# Patient Record
Sex: MALE | Race: WHITE
[De-identification: names, ages, dates, MRNs, and addresses within clinical notes are randomized per-mention and may not be internally consistent; named-entity substitution may affect disease eponyms.]

---

## 2018-07-22 ENCOUNTER — HOSPITAL ENCOUNTER (EMERGENCY)
Dept: HOSPITAL 11 - JP.ED | Age: 43
Discharge: HOME | End: 2018-07-22
Payer: SELF-PAY

## 2018-07-22 VITALS — SYSTOLIC BLOOD PRESSURE: 136 MMHG | DIASTOLIC BLOOD PRESSURE: 90 MMHG

## 2018-07-22 DIAGNOSIS — F17.210: ICD-10-CM
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
## 2018-07-22 DIAGNOSIS — S62.620B: Primary | ICD-10-CM

## 2018-07-22 PROCEDURE — 73140 X-RAY EXAM OF FINGER(S): CPT

## 2018-07-22 PROCEDURE — 90471 IMMUNIZATION ADMIN: CPT

## 2018-07-22 PROCEDURE — 99284 EMERGENCY DEPT VISIT MOD MDM: CPT

## 2018-07-22 PROCEDURE — 96372 THER/PROPH/DIAG INJ SC/IM: CPT

## 2018-07-22 PROCEDURE — 90715 TDAP VACCINE 7 YRS/> IM: CPT

## 2018-07-22 NOTE — EDM.PDOC
ED HPI GENERAL MEDICAL PROBLEM





- General


Chief Complaint: Upper Extremity Injury/Pain


Stated Complaint: BROKEN RT POINTER FINGER


Time Seen by Provider: 07/22/18 09:32


Source of Information: Reports: Patient, RN Notes Reviewed


History Limitations: Reports: No Limitations





- History of Present Illness


INITIAL COMMENTS - FREE TEXT/NARRATIVE: 





42-year-old gentleman presents to the emergency department today with complaint 

of trauma to his right pointer finger, he accidentally crushed this between a 

metal stake in a 10 pound slight September 2 days ago, he would like to have it 

checked out he does have a laceration over the proximal phalange he and his 

started to heal by second intention he has had this wound wrapped


  ** Right 2-Index finger


Pain Score (Numeric/FACES): 8





- Related Data


 Allergies











Allergy/AdvReac Type Severity Reaction Status Date / Time


 


No Known Allergies Allergy   Verified 07/22/18 09:23











Home Meds: 


 Home Meds





NK [No Known Home Meds]  07/22/18 [History]











Past Medical History


Musculoskeletal History: Reports: Fracture





- Past Surgical History


GI Surgical History: Reports: Appendectomy, Hernia Repair/Other





Social & Family History





- Family History


Cardiac: Reports: MI


Other GI Family History: Colon cancer


Neurological: Reports: CVA


Oncologic: Reports: Colon





- Tobacco Use


Smoking Status *Q: Current Every Day Smoker


Years of Tobacco use: 16


Packs/Tins Daily: 0.5





- Caffeine Use


Caffeine Use: Reports: Coffee





- Alcohol Use


Days Per Week of Alcohol Use: 2


Number of Drinks Per Day: 6


Total Drinks Per Week: 12





- Recreational Drug Use


Recreational Drug Use: Yes


Drug Use in Last 12 Months: Yes


Recreational Drug Type: Reports: Marijuana/Hashish


Recreational Drug Use Frequency: Socially





Review of Systems





- Review of Systems


Review Of Systems: See Below


Musculoskeletal: Reports: Hand Pain


Skin: Reports: Wound


Neurological: Reports: No Symptoms





ED EXAM, GENERAL





- Physical Exam


Exam: See Below


Free Text/Narrative:: 





Examination of the right hand I do appreciate approximately 2 cm laceration 

appears to be superficial of the started to heal by second intention over the 

dorsal aspect of the phalange he proximal digit #2 he has edema appreciated in 

digit #2 there is limited range of motion but he can move all joints and all 

digits secondary to edema and pain





ED TRAUMA EXTREMITY PROCEDURES





- Splinting


  ** Right 2nd Digit


Splint Site: Second digit middle phalanx


Pre-Procedure NV Status: Normal


Post-Procedure NV Status: Normal


Splint Material: Aluminum-Foam


Splint Design: Other (Finger)


Applied & Form Fitted By: Nurse


Provider Post-Splint Application NV Check: NV Status Normal, Good Position


Complications: No





Course





- Vital Signs


Last Recorded V/S: 


 Last Vital Signs











Temp  98.4 F   07/22/18 09:28


 


Pulse  90   07/22/18 09:28


 


Resp  14   07/22/18 09:28


 


BP  136/90   07/22/18 09:28


 


Pulse Ox  100   07/22/18 09:28














- Orders/Labs/Meds


Orders: 


 Active Orders 24 hr











 Category Date Time Status


 


 Fingers Second Digit Rt F6 [CR] Stat Exams  07/22/18 09:34 Taken














Departure





- Departure


Time of Disposition: 10:16


Disposition: Home, Self-Care 01


Condition: Good


Clinical Impression: 


Open fracture of middle phalanx of right index finger


Qualifiers:


 Encounter type: initial encounter Fracture alignment: displaced Qualified Code(

s): S62.620B - Displaced fracture of middle phalanx of right index finger, 

initial encounter for open fracture








- Discharge Information


Referrals: 


PCP,None [Primary Care Provider] - 


Forms:  ED Department Discharge


Additional Instructions: 


Please follow-up with orthopedics tomorrow





- My Orders


Last 24 Hours: 


My Active Orders





07/22/18 09:34


Fingers Second Digit Rt F6 [CR] Stat 














- Assessment/Plan


Last 24 Hours: 


My Active Orders





07/22/18 09:34


Fingers Second Digit Rt F6 [CR] Stat 











Plan: 





Assessment





Acuity = acute





Site and laterality = fracture right index middle phalange open  





Etiology  = secondary trauma  





Manifestations = none  





Location of injury =  Home





Lab values = x-ray describes fracture above  





Plan


Tetanus was updated today, given 2 g Ancef 1, prescription written for Keflex 

500 mg by mouth 3 times a day 7 days, set up to see orthopedics tomorrow  

















 This note was dictated using dragon voice recognition software please call 

with any questions on syntax or grammar.

## 2018-07-23 NOTE — CR
Fingers Second Digit Rt F6

 

CLINICAL HISTORY: Right index finger injury

 

FINDINGS: There is an oblique minimally displaced fracture through the second middle phalanx

 

IMPRESSION: Fracture second middle phalanx